# Patient Record
Sex: MALE | Race: WHITE | NOT HISPANIC OR LATINO | URBAN - METROPOLITAN AREA
[De-identification: names, ages, dates, MRNs, and addresses within clinical notes are randomized per-mention and may not be internally consistent; named-entity substitution may affect disease eponyms.]

---

## 2022-03-09 ENCOUNTER — EMERGENCY (EMERGENCY)
Facility: HOSPITAL | Age: 22
LOS: 1 days | Discharge: HOME | End: 2022-03-09
Admitting: EMERGENCY MEDICINE
Payer: SELF-PAY

## 2022-03-09 VITALS
TEMPERATURE: 99 F | OXYGEN SATURATION: 97 % | WEIGHT: 145.06 LBS | HEART RATE: 76 BPM | DIASTOLIC BLOOD PRESSURE: 78 MMHG | SYSTOLIC BLOOD PRESSURE: 125 MMHG | RESPIRATION RATE: 18 BRPM

## 2022-03-09 DIAGNOSIS — H01.009 UNSPECIFIED BLEPHARITIS UNSPECIFIED EYE, UNSPECIFIED EYELID: ICD-10-CM

## 2022-03-09 DIAGNOSIS — H02.841 EDEMA OF RIGHT UPPER EYELID: ICD-10-CM

## 2022-03-09 PROCEDURE — 99283 EMERGENCY DEPT VISIT LOW MDM: CPT

## 2022-03-09 RX ORDER — AZITHROMYCIN MONOHYDRATE 10 MG/ML
1 SOLUTION/ DROPS OPHTHALMIC
Qty: 1 | Refills: 0
Start: 2022-03-09

## 2022-03-09 RX ORDER — AZTREONAM 2 G
1 VIAL (EA) INJECTION
Qty: 14 | Refills: 0
Start: 2022-03-09 | End: 2022-03-15

## 2022-03-09 RX ADMIN — Medication 1 TABLET(S): at 13:19

## 2022-03-09 NOTE — ED PROVIDER NOTE - NSFOLLOWUPINSTRUCTIONS_ED_ALL_ED_FT
Please take antibiotics as prescribed  Warm compresses three to five times a day for a week    Follow up with your doctor for re-evaluation in 3-4 days    RETURN FOR WORSENING PAIN, SWELLING, REDNESS, FEVER, DRAINAGE, CHILLS, EYE PROTRUSION, VISUAL CHANGES OR ANY CONCERNS.

## 2022-03-09 NOTE — ED ADULT NURSE NOTE - NSIMPLEMENTINTERV_GEN_ALL_ED
Implemented All Universal Safety Interventions:  [place] to call system. Call bell, personal items and telephone within reach. Instruct patient to call for assistance. Room bathroom lighting operational. Non-slip footwear when patient is off stretcher. Physically safe environment: no spills, clutter or unnecessary equipment. Stretcher in lowest position, wheels locked, appropriate side rails in place.

## 2022-03-09 NOTE — ED PROVIDER NOTE - CLINICAL SUMMARY MEDICAL DECISION MAKING FREE TEXT BOX
left eye blepharitis, initially started as stye, afebrile. no systemic symptoms. well appearing. will rx bactrim, erythromycin ointment at night, warm compresses, f/u pmd, strict return precautions discussed, patient agrees with plan.

## 2022-03-09 NOTE — ED PROVIDER NOTE - OBJECTIVE STATEMENT
22 yo male c/o left eyelid swelling with redness x 3-4 days, initially started as stye (based on cell phone pictures patient showed me). denies  trauma, fever/chills or drainage. does not wear contacts. does not wear make up. minimal pain.

## 2022-03-09 NOTE — ED PROVIDER NOTE - PATIENT PORTAL LINK FT
You can access the FollowMyHealth Patient Portal offered by [facility] by registering at the following website: [email or web address]. By joining [email or web address] FollowMyHealth portal, you will also be able to view your health information using other applications (apps) compatible with our system.

## 2022-03-09 NOTE — ED ADULT NURSE NOTE - OBJECTIVE STATEMENT
Pt presents to ED c/o left eye swelling x yesterday. Denies any vision changes. Denies f/c/n/v/d, cp, sob, cough. Pt aox4, MAEx4, spont unlabored breathing on ra.